# Patient Record
Sex: FEMALE | Race: WHITE | NOT HISPANIC OR LATINO | Employment: OTHER | URBAN - METROPOLITAN AREA
[De-identification: names, ages, dates, MRNs, and addresses within clinical notes are randomized per-mention and may not be internally consistent; named-entity substitution may affect disease eponyms.]

---

## 2021-07-14 ENCOUNTER — HOSPITAL ENCOUNTER (OUTPATIENT)
Dept: MRI IMAGING | Facility: HOSPITAL | Age: 56
Discharge: HOME | End: 2021-07-14
Payer: COMMERCIAL

## 2021-07-14 DIAGNOSIS — M54.12 RADICULOPATHY, CERVICAL REGION: ICD-10-CM

## 2021-07-14 PROCEDURE — 72141 MRI NECK SPINE W/O DYE: CPT

## 2021-07-14 PROCEDURE — G1004 CDSM NDSC: HCPCS

## 2022-05-26 ENCOUNTER — VBI (OUTPATIENT)
Dept: ADMINISTRATIVE | Facility: OTHER | Age: 57
End: 2022-05-26

## 2022-05-26 RX ORDER — CHOLECALCIFEROL (VITAMIN D3) 25 MCG
1 CAPSULE ORAL DAILY
COMMUNITY

## 2022-05-26 RX ORDER — ASCORBIC ACID/MULTIVIT-MIN 1000 MG
1 EFFERVESCENT POWDER IN PACKET ORAL DAILY
COMMUNITY

## 2022-05-26 NOTE — TELEPHONE ENCOUNTER
Upon review of the In Basket request we were able to locate, review, and update the patient chart as requested for Mammogram     Any additional questions or concerns should be emailed to the Practice Liaisons via [email or web address] email, please do not reply via In Basket      Thank you  Glenna Renae

## 2022-05-27 ENCOUNTER — ANNUAL EXAM (OUTPATIENT)
Dept: OBGYN CLINIC | Facility: CLINIC | Age: 57
End: 2022-05-27
Payer: COMMERCIAL

## 2022-05-27 VITALS
DIASTOLIC BLOOD PRESSURE: 98 MMHG | HEIGHT: 64 IN | SYSTOLIC BLOOD PRESSURE: 150 MMHG | BODY MASS INDEX: 30.87 KG/M2 | WEIGHT: 180.8 LBS

## 2022-05-27 DIAGNOSIS — Z01.419 ENCOUNTER FOR ANNUAL ROUTINE GYNECOLOGICAL EXAMINATION: Primary | ICD-10-CM

## 2022-05-27 DIAGNOSIS — Z78.0 MENOPAUSE: ICD-10-CM

## 2022-05-27 DIAGNOSIS — Z12.31 BREAST CANCER SCREENING BY MAMMOGRAM: ICD-10-CM

## 2022-05-27 PROCEDURE — 99396 PREV VISIT EST AGE 40-64: CPT | Performed by: OBSTETRICS & GYNECOLOGY

## 2022-05-27 RX ORDER — LORATADINE 10 MG/1
10 TABLET ORAL AS NEEDED
COMMUNITY

## 2022-05-27 RX ORDER — NORETHINDRONE 0.35 MG/1
1 TABLET ORAL DAILY
COMMUNITY
Start: 2022-02-28

## 2022-05-27 RX ORDER — UBIDECARENONE 50 MG
1 CAPSULE ORAL DAILY
COMMUNITY

## 2022-05-27 RX ORDER — B-COMPLEX WITH VITAMIN C
1 TABLET ORAL DAILY
COMMUNITY

## 2022-05-27 RX ORDER — IBUPROFEN 200 MG
TABLET ORAL DAILY PRN
COMMUNITY

## 2022-05-27 NOTE — LETTER
May 27, 2022     Dee Olguin MD  [address] [facility] [address]    Patient: Mundo Kulkarni   YOB: 1965   Date of Visit: 5/27/2022       Dear Dr Elias Garcia: Thank you for referring Annjonathan Levin to me for evaluation  Below are my notes for this consultation  If you have questions, please do not hesitate to call me  I look forward to following your patient along with you  Sincerely,        Arias Elliott MD        CC: No Recipients  Arias Elliott MD  5/27/2022  3:15 PM  Sign when Signing Visit  Annual Wellness Visit  [address]   [address], [address], [place] [facility]    ASSESSMENT/PLAN: Mundo Kulkarni is a 61 y o  Larkin Nataly who presents for annual gynecologic exam     Encounter for routine gynecologic examination  - Routine well woman exam completed today  - Cervical Cancer Screening: Current ASCCP Guidelines reviewed  Last Pap: 03/01/2021 normal  Next Pap Due: 2 years, routine   - Contraceptive counseling discussed  Current contraception: oral progesterone-only contraceptive,   - Breast Cancer Screening: Last Mammogram 5/2022 per pt normal  - Colorectal cancer screening last 2017, next due 2027  - The following were reviewed in today's visit: mammography screening ordered, family planning choices, menopause, adequate intake of calcium and vitamin D, exercise and healthy diet    Additional problems addressed during this visit:  1  Encounter for annual routine gynecological examination    2  Breast cancer screening by mammogram  -     Mammo screening bilateral w 3d & cad; Future; Expected date: 05/26/2023    3  Menopause  Comments:  Has been on POPs - no bleeding in two years and now 59yo  Feel pt is menopausal and can stop POPs  To call if has menopausal issues  Next visit: 1 year Wellness      CC:  Annual Gynecologic Examination    HPI: Mundo Kulkarni is a 61 y o  Larkin Nataly who presents for annual gynecologic examination    She denies any breast, urinary or pelvic issues at today's visit  Pt on POPs - no period in last two years  Discussed likely menopausal given age and lack of bleeding  Will stop  Gyn History  No LMP recorded  Patient is postmenopausal      Last Pap: 2021 was normal    She  reports being sexually active and has had partner(s) who are male  She reports using the following methods of birth control/protection: OCP and Post-menopausal        OB History      Past Medical History:  No date: GERD (gastroesophageal reflux disease)  No date: Vertigo      Comment:  2018, episode resolved     Past Surgical History:  No date: PALATE SURGERY  No date: WISDOM TOOTH EXTRACTION; Bilateral     No family history on file  Social History     Tobacco Use    Smoking status: Never Smoker    Smokeless tobacco: Never Used   Substance Use Topics    Alcohol use: Yes     Alcohol/week: 5 0 - 8 0 standard drinks     Types: 5 - 8 Standard drinks or equivalent per week    Drug use: Never          Current Outpatient Medications:     Calcium Carbonate-Vitamin D (CALCIUM PLUS VITAMIN D PO), Take 1 tablet by mouth in the morning, Disp: , Rfl:     Cholecalciferol (Vitamin D-3) 25 MCG (1000 UT) CAPS, Take 1 capsule by mouth in the morning, Disp: , Rfl:     ibuprofen (MOTRIN) 200 mg tablet, daily as needed, Disp: , Rfl:     Incassia 0 35 MG tablet, Take 1 tablet by mouth daily, Disp: , Rfl:     loratadine (CLARITIN) 10 mg tablet, Take 10 mg by mouth if needed for allergies, Disp: , Rfl:     Multiple Vitamins-Minerals (Emergen-C Immune Plus) PACK, Take 1 packet by mouth in the morning, Disp: , Rfl:     Red Yeast Rice 600 MG TABS, Take 1 tablet by mouth in the morning, Disp: , Rfl:     Zinc 100 MG TABS, Take 1 tablet by mouth in the morning, Disp: , Rfl:     She is allergic to prochlorperazine       ROS negative except as noted in HPI    Objective:  /98   Ht 5' 3 75" (1 619 m)   Wt 82 kg (180 lb 12 8 oz) Breastfeeding No   BMI 31 28 kg/m²      Physical Exam  Constitutional:       Appearance: Normal appearance  Chest:   Breasts:      Right: Normal  No mass, tenderness or axillary adenopathy  Left: Normal  No mass, tenderness or axillary adenopathy  Abdominal:      Palpations: Abdomen is soft  Tenderness: There is no abdominal tenderness  Genitourinary:     General: Normal vulva  Vagina: No bleeding or lesions  Cervix: Normal       Uterus: Normal  Not tender  Adnexa:         Right: No mass or tenderness  Left: No mass or tenderness  Rectum: No mass or external hemorrhoid  Normal anal tone  Comments: Atrophic changes appropriate to age  Musculoskeletal:         General: Normal range of motion  Lymphadenopathy:      Upper Body:      Right upper body: No axillary adenopathy  Left upper body: No axillary adenopathy  Neurological:      Mental Status: She is alert and oriented to person, place, and time     Psychiatric:         Mood and Affect: Mood normal          Behavior: Behavior normal

## 2022-05-27 NOTE — PROGRESS NOTES
Annual Wellness Visit  [address]   [address], [address], [place] [facility] 1    ASSESSMENT/PLAN: Epifanio Awad is a 61 y o  Yi Homer who presents for annual gynecologic exam     Encounter for routine gynecologic examination  - Routine well woman exam completed today  - Cervical Cancer Screening: Current ASCCP Guidelines reviewed  Last Pap: 2021 normal  Next Pap Due: 2 years, routine   - Contraceptive counseling discussed  Current contraception: oral progesterone-only contraceptive,   - Breast Cancer Screening: Last Mammogram 2022 per pt normal  - Colorectal cancer screening last , next due   - The following were reviewed in today's visit: mammography screening ordered, family planning choices, menopause, adequate intake of calcium and vitamin D, exercise and healthy diet    Additional problems addressed during this visit:  1  Encounter for annual routine gynecological examination    2  Breast cancer screening by mammogram  -     Mammo screening bilateral w 3d & cad; Future; Expected date: 2023    3  Menopause  Comments:  Has been on POPs - no bleeding in two years and now 57yo  Feel pt is menopausal and can stop POPs  To call if has menopausal issues  Next visit: 1 year Wellness      CC:  Annual Gynecologic Examination    HPI: Epifanio Awad is a 61 y o  Yi Homer who presents for annual gynecologic examination  She denies any breast, urinary or pelvic issues at today's visit  Pt on POPs - no period in last two years  Discussed likely menopausal given age and lack of bleeding  Will stop  Gyn History  No LMP recorded  Patient is postmenopausal      Last Pap: 2021 was normal    She  reports being sexually active and has had partner(s) who are male   She reports using the following methods of birth control/protection: OCP and Post-menopausal        OB History      Past Medical History:  No date: GERD (gastroesophageal reflux disease)  No date: Vertigo      Comment:  2018, episode resolved     Past Surgical History:  No date: PALATE SURGERY  No date: WISDOM TOOTH EXTRACTION; Bilateral     No family history on file  Social History     Tobacco Use    Smoking status: Never Smoker    Smokeless tobacco: Never Used   Substance Use Topics    Alcohol use: Yes     Alcohol/week: 5 0 - 8 0 standard drinks     Types: 5 - 8 Standard drinks or equivalent per week    Drug use: Never          Current Outpatient Medications:     Calcium Carbonate-Vitamin D (CALCIUM PLUS VITAMIN D PO), Take 1 tablet by mouth in the morning, Disp: , Rfl:     Cholecalciferol (Vitamin D-3) 25 MCG (1000 UT) CAPS, Take 1 capsule by mouth in the morning, Disp: , Rfl:     ibuprofen (MOTRIN) 200 mg tablet, daily as needed, Disp: , Rfl:     Incassia 0 35 MG tablet, Take 1 tablet by mouth daily, Disp: , Rfl:     loratadine (CLARITIN) 10 mg tablet, Take 10 mg by mouth if needed for allergies, Disp: , Rfl:     Multiple Vitamins-Minerals (Emergen-C Immune Plus) PACK, Take 1 packet by mouth in the morning, Disp: , Rfl:     Red Yeast Rice 600 MG TABS, Take 1 tablet by mouth in the morning, Disp: , Rfl:     Zinc 100 MG TABS, Take 1 tablet by mouth in the morning, Disp: , Rfl:     She is allergic to prochlorperazine       ROS negative except as noted in HPI    Objective:  /98   Ht 5' 3 75" (1 619 m)   Wt 82 kg (180 lb 12 8 oz)   Breastfeeding No   BMI 31 28 kg/m²      Physical Exam  Constitutional:       Appearance: Normal appearance  Chest:   Breasts:      Right: Normal  No mass, tenderness or axillary adenopathy  Left: Normal  No mass, tenderness or axillary adenopathy  Abdominal:      Palpations: Abdomen is soft  Tenderness: There is no abdominal tenderness  Genitourinary:     General: Normal vulva  Vagina: No bleeding or lesions  Cervix: Normal       Uterus: Normal  Not tender  Adnexa:         Right: No mass or tenderness  Left: No mass or tenderness  Rectum: No mass or external hemorrhoid  Normal anal tone  Comments: Atrophic changes appropriate to age  Musculoskeletal:         General: Normal range of motion  Lymphadenopathy:      Upper Body:      Right upper body: No axillary adenopathy  Left upper body: No axillary adenopathy  Neurological:      Mental Status: She is alert and oriented to person, place, and time     Psychiatric:         Mood and Affect: Mood normal          Behavior: Behavior normal

## 2022-12-14 ENCOUNTER — HOSPITAL ENCOUNTER (OUTPATIENT)
Dept: MRI IMAGING | Facility: HOSPITAL | Age: 57
Discharge: HOME | End: 2022-12-14

## 2022-12-14 DIAGNOSIS — M54.12 RADICULOPATHY, CERVICAL REGION: ICD-10-CM

## 2024-02-19 ENCOUNTER — TELEPHONE (OUTPATIENT)
Dept: OBGYN CLINIC | Facility: CLINIC | Age: 59
End: 2024-02-19

## 2024-02-19 DIAGNOSIS — Z12.31 BREAST CANCER SCREENING BY MAMMOGRAM: Primary | ICD-10-CM

## 2024-02-19 NOTE — TELEPHONE ENCOUNTER
Requesting Mammogram order prior to wellness appointment, will be using a [facility] facility, please notify after order has been generated.

## 2024-03-20 ENCOUNTER — HOSPITAL ENCOUNTER (OUTPATIENT)
Dept: MAMMOGRAPHY | Facility: CLINIC | Age: 59
Discharge: HOME | End: 2024-03-20
Payer: COMMERCIAL

## 2024-03-20 VITALS — WEIGHT: 180 LBS | HEIGHT: 64 IN | BODY MASS INDEX: 30.73 KG/M2

## 2024-03-20 DIAGNOSIS — Z12.31 BREAST CANCER SCREENING BY MAMMOGRAM: ICD-10-CM

## 2024-03-20 PROCEDURE — 77063 BREAST TOMOSYNTHESIS BI: CPT

## 2024-03-20 PROCEDURE — 77067 SCR MAMMO BI INCL CAD: CPT

## 2024-03-28 ENCOUNTER — ANNUAL EXAM (OUTPATIENT)
Dept: OBGYN CLINIC | Facility: CLINIC | Age: 59
End: 2024-03-28
Payer: COMMERCIAL

## 2024-03-28 VITALS
HEIGHT: 64 IN | WEIGHT: 189 LBS | DIASTOLIC BLOOD PRESSURE: 84 MMHG | BODY MASS INDEX: 32.27 KG/M2 | SYSTOLIC BLOOD PRESSURE: 128 MMHG

## 2024-03-28 DIAGNOSIS — Z12.4 CERVICAL CANCER SCREENING: ICD-10-CM

## 2024-03-28 DIAGNOSIS — Z01.419 ENCOUNTER FOR ANNUAL ROUTINE GYNECOLOGICAL EXAMINATION: Primary | ICD-10-CM

## 2024-03-28 DIAGNOSIS — N95.1 MENOPAUSAL SYMPTOMS: ICD-10-CM

## 2024-03-28 DIAGNOSIS — Z12.31 BREAST CANCER SCREENING BY MAMMOGRAM: ICD-10-CM

## 2024-03-28 PROCEDURE — 99396 PREV VISIT EST AGE 40-64: CPT | Performed by: OBSTETRICS & GYNECOLOGY

## 2024-03-28 RX ORDER — CETIRIZINE HYDROCHLORIDE 10 MG/1
TABLET ORAL
COMMUNITY
Start: 2023-07-01

## 2024-03-28 RX ORDER — ESTRADIOL/NORETHINDRONE ACETATE TRANSDERMAL SYSTEM .05; .14 MG/D; MG/D
1 PATCH, EXTENDED RELEASE TRANSDERMAL 2 TIMES WEEKLY
Qty: 8 PATCH | Refills: 1 | Status: SHIPPED | OUTPATIENT
Start: 2024-03-28

## 2024-03-28 NOTE — LETTER
2024     Romina Flores MD  [address] [facility] [address]    Patient: Susan Poe   YOB: 1965   Date of Visit: 3/28/2024       Dear Dr. Flores:    Thank you for referring Susan Poe to me for evaluation. Below are my notes for this consultation.    If you have questions, please do not hesitate to call me. I look forward to following your patient along with you.         Sincerely,        Teri Gross MD        CC: No Recipients    Teri Gross MD  3/28/2024  5:15 PM  Sign when Signing Visit  Annual Wellness Visit  [facility] - [address], [address], [place] [address]    ASSESSMENT/PLAN: Susan Poe is a 58 y.o.  who presents for annual gynecologic exam.    Encounter for routine gynecologic examination  - Routine well woman exam completed today.  - Cervical Cancer Screening: Current ASCCP Guidelines reviewed. Last Pap: 2021 normal. Past abnormal pap:  LGSIL.  Next Pap Due: today.  - Contraceptive counseling discussed.  Current contraception: postmenopausal  - Breast Cancer Screening: Last Mammogram 2024, normal  - Colorectal cancer screening last  per pt, next due .  - The following were reviewed in today's visit: mammography screening ordered, use and side effects of HRT, menopause, exercise, and healthy diet    Additional problems addressed during this visit:  1. Encounter for annual routine gynecological examination    2. Breast cancer screening by mammogram  -     Mammo screening bilateral w 3d & cad; Future    3. Cervical cancer screening  -     Thinprep Tis Pap and HPV mRNA E6/E7 Reflex HPV 16,18/45    4. Menopausal symptoms  Assessment & Plan:  Reviewed hot flashes and vasomotor symptoms can be treated with SSRIs, gabapentin, or HRT.  HRT will additionally help vaginal dryness, although vaginal preparations are available as well.  It will likely  not help weight loss or libido.   Discussion of pt  regarding risk and benefits of HRT including review of data from WHI study.  Contraindications to HRT include active or known coronary artery disease, breast cancer, previous VTE or stroke, active liver disease, undiagnosed vaginal bleeding.  Risks are increase risk in VTE, stroke, CAD and breast cancer (risks for estrogen only tx for pt's w/ hysterectomy are only stroke and VTE).  Studies show these risks increase with age at treatment, age at start of treatment and prolonged use.  Data from the HERS study demonstrate short term use of HRT started w/in 10 yrs of menopause have no increase CAD risk.  Risks can be limited by limiting duration of therapy to 5 yrs.  Benefit are reduction hot flashes/night sweats, improve general well being and energy, decrease dysparunia, decrease fracture and colon cancer risk.     After review of pt history and risk and benefits of HRT, pt wishes to start treatment.  Reviewed VTE risk may be lower w/ transdermal estrogen, so recom starting with that at low dose and titrate to symptoms over time.   Should notice response in 2wks time, if not will increase.  Occas vaginal spotting or bleeding okay in first 3-6 months, but should resolve.  Return in 4-6 weeks for f/u.    Orders:  -     estradiol-norethindrone (CombiPatch) 0.05-0.14 MG/DAY; Place 1 patch on the skin 2 (two) times a week        Next visit: 1 year Wellness      CC:  Annual Gynecologic Examination    HPI: Susan Poe is a 58 y.o.  who presents for annual gynecologic examination.      Was on POPs for contraception until age 57yo, when stopped had not had a period in last two years, so likely menopause between 55-57yo.  Having menopausal symptoms, wants to discuss HRT    Having hot flashes day and night.  C/o skin, hair and vaginal dryness.  Decrease libido, brain fog - all more in last year.  Unable to lose weight.  Did try Estroven supplement for about a month with no improvement.      Sexually active, no new  "partners.  Vaginal dryness, using coconut oil.         Gyn History  No LMP recorded. Patient is postmenopausal.     Last Pap: 2021 was normal    She  reports being sexually active and has had partner(s) who are male. She reports using the following methods of birth control/protection: OCP and Post-menopausal.       OB History      Past Medical History:  No date: Allergies  No date: GERD (gastroesophageal reflux disease)  No date: Vertigo      Comment:  2018, episode resolved     Past Surgical History:  No date: PALATE SURGERY  No date: WISDOM TOOTH EXTRACTION; Bilateral     Family History   Problem Relation Age of Onset   • Colon cancer Paternal Aunt    • Breast cancer Neg Hx         Social History     Tobacco Use   • Smoking status: Never   • Smokeless tobacco: Never   Substance Use Topics   • Alcohol use: Yes     Alcohol/week: 5.0 - 8.0 standard drinks of alcohol     Types: 5 - 8 Standard drinks or equivalent per week   • Drug use: Never          Current Outpatient Medications:   •  Calcium Carbonate-Vitamin D (CALCIUM PLUS VITAMIN D PO), Take 1 tablet by mouth in the morning, Disp: , Rfl:   •  cetirizine (ZyrTEC Allergy) 10 mg tablet, , Disp: , Rfl:   •  ibuprofen (MOTRIN) 200 mg tablet, daily as needed, Disp: , Rfl:   •  Multiple Vitamins-Minerals (Emergen-C Immune Plus) PACK, Take 1 packet by mouth in the morning, Disp: , Rfl:   •  Zinc 100 MG TABS, Take 1 tablet by mouth in the morning, Disp: , Rfl:     She is allergic to prochlorperazine..    ROS negative except as noted in HPI    Objective:  /84 (BP Location: Left arm, Patient Position: Sitting, Cuff Size: Standard)   Ht 5' 3.75\" (1.619 m)   Wt 85.7 kg (189 lb)   BMI 32.70 kg/m²      Physical Exam  Constitutional:       Appearance: Normal appearance.   Chest:   Breasts:     Right: Normal. No mass or tenderness.      Left: Normal. No mass or tenderness.   Abdominal:      Palpations: Abdomen is soft.      Tenderness: There is no abdominal " tenderness.   Genitourinary:     General: Normal vulva.      Vagina: No bleeding or lesions.      Cervix: Normal.      Uterus: Normal. Not tender.       Adnexa:         Right: No mass or tenderness.          Left: No mass or tenderness.        Rectum: No mass or external hemorrhoid. Normal anal tone.      Comments: Atrophic changes appropriate to age  Musculoskeletal:         General: Normal range of motion.   Lymphadenopathy:      Upper Body:      Right upper body: No axillary adenopathy.      Left upper body: No axillary adenopathy.   Neurological:      Mental Status: She is alert and oriented to person, place, and time.   Psychiatric:         Mood and Affect: Mood normal.         Behavior: Behavior normal.

## 2024-03-28 NOTE — ASSESSMENT & PLAN NOTE
Reviewed hot flashes and vasomotor symptoms can be treated with SSRIs, gabapentin, or HRT.  HRT will additionally help vaginal dryness, although vaginal preparations are available as well.  It will likely  not help weight loss or libido.   Discussion of pt regarding risk and benefits of HRT including review of data from WHI study.  Contraindications to HRT include active or known coronary artery disease, breast cancer, previous VTE or stroke, active liver disease, undiagnosed vaginal bleeding.  Risks are increase risk in VTE, stroke, CAD and breast cancer (risks for estrogen only tx for pt's w/ hysterectomy are only stroke and VTE).  Studies show these risks increase with age at treatment, age at start of treatment and prolonged use.  Data from the HERS study demonstrate short term use of HRT started w/in 10 yrs of menopause have no increase CAD risk.  Risks can be limited by limiting duration of therapy to 5 yrs.  Benefit are reduction hot flashes/night sweats, improve general well being and energy, decrease dysparunia, decrease fracture and colon cancer risk.     After review of pt history and risk and benefits of HRT, pt wishes to start treatment.  Reviewed VTE risk may be lower w/ transdermal estrogen, so recom starting with that at low dose and titrate to symptoms over time.   Should notice response in 2wks time, if not will increase.  Occas vaginal spotting or bleeding okay in first 3-6 months, but should resolve.  Return in 4-6 weeks for f/u.

## 2024-03-28 NOTE — PROGRESS NOTES
Annual Wellness Visit  [facility] - [address], [address], [place] [address]    ASSESSMENT/PLAN: Susan Poe is a 58 y.o.  who presents for annual gynecologic exam.    Encounter for routine gynecologic examination  - Routine well woman exam completed today.  - Cervical Cancer Screening: Current ASCCP Guidelines reviewed. Last Pap: 2021 normal. Past abnormal pap:  LGSIL.  Next Pap Due: today.  - Contraceptive counseling discussed.  Current contraception: postmenopausal  - Breast Cancer Screening: Last Mammogram 2024, normal  - Colorectal cancer screening last  per pt, next due .  - The following were reviewed in today's visit: mammography screening ordered, use and side effects of HRT, menopause, exercise, and healthy diet    Additional problems addressed during this visit:  1. Encounter for annual routine gynecological examination    2. Breast cancer screening by mammogram  -     Mammo screening bilateral w 3d & cad; Future    3. Cervical cancer screening  -     Thinprep Tis Pap and HPV mRNA E6/E7 Reflex HPV 16,18/45    4. Menopausal symptoms  Assessment & Plan:  Reviewed hot flashes and vasomotor symptoms can be treated with SSRIs, gabapentin, or HRT.  HRT will additionally help vaginal dryness, although vaginal preparations are available as well.  It will likely  not help weight loss or libido.   Discussion of pt regarding risk and benefits of HRT including review of data from WHI study.  Contraindications to HRT include active or known coronary artery disease, breast cancer, previous VTE or stroke, active liver disease, undiagnosed vaginal bleeding.  Risks are increase risk in VTE, stroke, CAD and breast cancer (risks for estrogen only tx for pt's w/ hysterectomy are only stroke and VTE).  Studies show these risks increase with age at treatment, age at start of treatment and prolonged use.  Data from the HERS study demonstrate short term use of  HRT started w/in 10 yrs of menopause have no increase CAD risk.  Risks can be limited by limiting duration of therapy to 5 yrs.  Benefit are reduction hot flashes/night sweats, improve general well being and energy, decrease dysparunia, decrease fracture and colon cancer risk.     After review of pt history and risk and benefits of HRT, pt wishes to start treatment.  Reviewed VTE risk may be lower w/ transdermal estrogen, so recom starting with that at low dose and titrate to symptoms over time.   Should notice response in 2wks time, if not will increase.  Occas vaginal spotting or bleeding okay in first 3-6 months, but should resolve.  Return in 4-6 weeks for f/u.    Orders:  -     estradiol-norethindrone (CombiPatch) 0.05-0.14 MG/DAY; Place 1 patch on the skin 2 (two) times a week        Next visit: 1 year Wellness      CC:  Annual Gynecologic Examination    HPI: Susan Poe is a 58 y.o.  who presents for annual gynecologic examination.      Was on POPs for contraception until age 55yo, when stopped had not had a period in last two years, so likely menopause between 55-56yo.  Having menopausal symptoms, wants to discuss HRT    Having hot flashes day and night.  C/o skin, hair and vaginal dryness.  Decrease libido, brain fog - all more in last year.  Unable to lose weight.  Did try Estroven supplement for about a month with no improvement.      Sexually active, no new partners.  Vaginal dryness, using coconut oil.         Gyn History  No LMP recorded. Patient is postmenopausal.     Last Pap: 2021 was normal    She  reports being sexually active and has had partner(s) who are male. She reports using the following methods of birth control/protection: OCP and Post-menopausal.       OB History      Past Medical History:  No date: Allergies  No date: GERD (gastroesophageal reflux disease)  No date: Vertigo      Comment:  2018, episode resolved     Past Surgical History:  No date: PALATE  "SURGERY  No date: WISDOM TOOTH EXTRACTION; Bilateral     Family History   Problem Relation Age of Onset    Colon cancer Paternal Aunt     Breast cancer Neg Hx         Social History     Tobacco Use    Smoking status: Never    Smokeless tobacco: Never   Substance Use Topics    Alcohol use: Yes     Alcohol/week: 5.0 - 8.0 standard drinks of alcohol     Types: 5 - 8 Standard drinks or equivalent per week    Drug use: Never          Current Outpatient Medications:     Calcium Carbonate-Vitamin D (CALCIUM PLUS VITAMIN D PO), Take 1 tablet by mouth in the morning, Disp: , Rfl:     cetirizine (ZyrTEC Allergy) 10 mg tablet, , Disp: , Rfl:     ibuprofen (MOTRIN) 200 mg tablet, daily as needed, Disp: , Rfl:     Multiple Vitamins-Minerals (Emergen-C Immune Plus) PACK, Take 1 packet by mouth in the morning, Disp: , Rfl:     Zinc 100 MG TABS, Take 1 tablet by mouth in the morning, Disp: , Rfl:     She is allergic to prochlorperazine..    ROS negative except as noted in HPI    Objective:  /84 (BP Location: Left arm, Patient Position: Sitting, Cuff Size: Standard)   Ht 5' 3.75\" (1.619 m)   Wt 85.7 kg (189 lb)   BMI 32.70 kg/m²      Physical Exam  Constitutional:       Appearance: Normal appearance.   Chest:   Breasts:     Right: Normal. No mass or tenderness.      Left: Normal. No mass or tenderness.   Abdominal:      Palpations: Abdomen is soft.      Tenderness: There is no abdominal tenderness.   Genitourinary:     General: Normal vulva.      Vagina: No bleeding or lesions.      Cervix: Normal.      Uterus: Normal. Not tender.       Adnexa:         Right: No mass or tenderness.          Left: No mass or tenderness.        Rectum: No mass or external hemorrhoid. Normal anal tone.      Comments: Atrophic changes appropriate to age  Musculoskeletal:         General: Normal range of motion.   Lymphadenopathy:      Upper Body:      Right upper body: No axillary adenopathy.      Left upper body: No axillary adenopathy. "   Neurological:      Mental Status: She is alert and oriented to person, place, and time.   Psychiatric:         Mood and Affect: Mood normal.         Behavior: Behavior normal.

## 2024-04-01 LAB
CLINICAL INFO: NORMAL
CYTO CVX: NORMAL
CYTOLOGY CMNT CVX/VAG CYTO-IMP: NORMAL
DATE PREVIOUS BX: NORMAL
HPV E6+E7 MRNA CVX QL NAA+PROBE: NOT DETECTED
LMP START DATE: NORMAL
SL AMB PREV. PAP:: NORMAL
SPECIMEN SOURCE CVX/VAG CYTO: NORMAL

## 2024-04-30 NOTE — PROGRESS NOTES
[facility] - [address], [address], [place] [address]    Assessment/Plan:  Susan is a 58 y.o. year old  who presents for follow up medication.  1. Menopausal symptoms  -     estradiol-norethindrone (CombiPatch) 0.05-0.14 MG/DAY; Place 1 patch on the skin 2 (two) times a week    2. Other screening mammogram  Comments:  pt asked for order  Orders:  -     Mammo screening bilateral w 3d & cad; Future        Next Exam: 2024 Wellness due    Subjective:     HPI: Susan Poe is a 58 y.o. who presents for medication follow up.   At 3/2024 WA - c/o hot flashes, brain fog, skin and hair changes.  OTC Estroven not helpful.  Discussed options and wishes to try HRT.    Started Combi patch.  Feels hot flashes less severe.  Does note sleep has been much better since starting, although she hadn't thought to mention that at her appointment before.  No bleeding or other side effects.        The following portions of the patient's history were reviewed and updated as appropriate: allergies, current medications, past family history, past medical history, obstetric history, gynecologic history, past social history, past surgical history and problem list.    ROS: Review of Systems   Constitutional: Negative.    Gastrointestinal: Negative.    Genitourinary: Negative.    Psychiatric/Behavioral: Negative.           Current Outpatient Medications:     Calcium Carbonate-Vitamin D (CALCIUM PLUS VITAMIN D PO), Take 1 tablet by mouth in the morning, Disp: , Rfl:     cetirizine (ZyrTEC Allergy) 10 mg tablet, , Disp: , Rfl:     estradiol-norethindrone (CombiPatch) 0.05-0.14 MG/DAY, Place 1 patch on the skin 2 (two) times a week, Disp: 24 patch, Rfl: 3    ibuprofen (MOTRIN) 200 mg tablet, daily as needed, Disp: , Rfl:     Multiple Vitamins-Minerals (Emergen-C Immune Plus) PACK, Take 1 packet by mouth in the morning, Disp: , Rfl:     Objective:  /84 (BP Location: Left arm, Patient Position: Sitting,  "Cuff Size: Standard)   Ht 5' 3.75\" (1.619 m)   Wt 88 kg (194 lb)   BMI 33.56 kg/m²      Physical Exam  Constitutional:       Appearance: Normal appearance.   Neurological:      Mental Status: She is alert and oriented to person, place, and time.   Psychiatric:         Behavior: Behavior normal.         "

## 2024-05-02 ENCOUNTER — OFFICE VISIT (OUTPATIENT)
Dept: OBGYN CLINIC | Facility: CLINIC | Age: 59
End: 2024-05-02
Payer: COMMERCIAL

## 2024-05-02 VITALS
SYSTOLIC BLOOD PRESSURE: 130 MMHG | HEIGHT: 64 IN | WEIGHT: 194 LBS | BODY MASS INDEX: 33.12 KG/M2 | DIASTOLIC BLOOD PRESSURE: 84 MMHG

## 2024-05-02 DIAGNOSIS — Z12.31 OTHER SCREENING MAMMOGRAM: ICD-10-CM

## 2024-05-02 DIAGNOSIS — N95.1 MENOPAUSAL SYMPTOMS: Primary | ICD-10-CM

## 2024-05-02 PROCEDURE — 99213 OFFICE O/P EST LOW 20 MIN: CPT | Performed by: OBSTETRICS & GYNECOLOGY

## 2024-05-02 RX ORDER — ESTRADIOL/NORETHINDRONE ACETATE TRANSDERMAL SYSTEM .05; .14 MG/D; MG/D
1 PATCH, EXTENDED RELEASE TRANSDERMAL 2 TIMES WEEKLY
Qty: 24 PATCH | Refills: 3 | Status: SHIPPED | OUTPATIENT
Start: 2024-05-02

## 2024-05-02 NOTE — ASSESSMENT & PLAN NOTE
Started Combipatch in march for menopausal symptoms.  States some improvement in hot flashes since started.  Excellent improvement in sleep.  She wishes to continue.  No problems.  Refilled for 1 year.

## 2025-03-25 ENCOUNTER — HOSPITAL ENCOUNTER (OUTPATIENT)
Dept: MAMMOGRAPHY | Facility: CLINIC | Age: 60
Discharge: HOME | End: 2025-03-25
Payer: COMMERCIAL

## 2025-03-25 VITALS — HEIGHT: 64 IN | WEIGHT: 194 LBS | BODY MASS INDEX: 33.12 KG/M2

## 2025-03-25 DIAGNOSIS — Z12.31 OTHER SCREENING MAMMOGRAM: ICD-10-CM

## 2025-03-25 PROCEDURE — 77067 SCR MAMMO BI INCL CAD: CPT

## 2025-03-25 PROCEDURE — 77063 BREAST TOMOSYNTHESIS BI: CPT

## 2025-03-31 ENCOUNTER — RESULTS FOLLOW-UP (OUTPATIENT)
Dept: OBGYN CLINIC | Facility: CLINIC | Age: 60
End: 2025-03-31

## 2025-04-10 DIAGNOSIS — N95.1 MENOPAUSAL SYMPTOMS: ICD-10-CM

## 2025-04-10 RX ORDER — ESTRADIOL/NORETHINDRONE ACETATE TRANSDERMAL SYSTEM .05; .14 MG/D; MG/D
PATCH, EXTENDED RELEASE TRANSDERMAL
Qty: 24 PATCH | Refills: 0 | Status: SHIPPED | OUTPATIENT
Start: 2025-04-10

## 2025-05-09 ENCOUNTER — ANNUAL EXAM (OUTPATIENT)
Dept: OBGYN CLINIC | Facility: CLINIC | Age: 60
End: 2025-05-09
Payer: COMMERCIAL

## 2025-05-09 VITALS
BODY MASS INDEX: 30.7 KG/M2 | HEIGHT: 64 IN | DIASTOLIC BLOOD PRESSURE: 88 MMHG | WEIGHT: 179.8 LBS | SYSTOLIC BLOOD PRESSURE: 142 MMHG

## 2025-05-09 DIAGNOSIS — Z01.419 ENCOUNTER FOR ANNUAL ROUTINE GYNECOLOGICAL EXAMINATION: Primary | ICD-10-CM

## 2025-05-09 DIAGNOSIS — Z12.31 BREAST CANCER SCREENING BY MAMMOGRAM: ICD-10-CM

## 2025-05-09 DIAGNOSIS — N95.1 MENOPAUSAL SYMPTOMS: ICD-10-CM

## 2025-05-09 PROCEDURE — S0612 ANNUAL GYNECOLOGICAL EXAMINA: HCPCS | Performed by: OBSTETRICS & GYNECOLOGY

## 2025-05-09 RX ORDER — ESTRADIOL/NORETHINDRONE ACETATE TRANSDERMAL SYSTEM .05; .14 MG/D; MG/D
1 PATCH, EXTENDED RELEASE TRANSDERMAL 2 TIMES WEEKLY
Qty: 24 PATCH | Refills: 4 | Status: SHIPPED | OUTPATIENT
Start: 2025-05-09

## 2025-05-09 RX ORDER — BENZONATATE 100 MG/1
100 CAPSULE ORAL 3 TIMES DAILY PRN
COMMUNITY

## 2025-05-09 RX ORDER — PSEUDOEPHEDRINE HCL 30 MG/1
60 TABLET, FILM COATED ORAL EVERY 4 HOURS PRN
COMMUNITY

## 2025-05-09 NOTE — PROGRESS NOTES
Annual Wellness Visit  [facility] - [address], [address], [place] [address]    ASSESSMENT/PLAN: Susan Poe is a 59 y.o.  who presents for annual gynecologic exam.    Assessment & Plan  Encounter for annual routine gynecological examination  - Routine well woman exam completed today.  - Cervical Cancer Screening: Current ASCCP Guidelines reviewed. Last Pap: 2024 normal. Past abnormal pap: h/o LGSIL .  Next Pap Due: 2 years.  - Contraceptive counseling discussed.  Current contraception: postmenopsausal  - Breast Cancer Screening: Last Mammogram 2025, normal  - Colorectal cancer screening last , next due .  - The following were reviewed in today's visit: mammography screening ordered, use and side effects of HRT, menopause, adequate intake of calcium and vitamin D, exercise, and healthy diet       Breast cancer screening by mammogram    Orders:    Mammo screening bilateral w 3d and cad; Future    Menopausal symptoms  Started Combipatch last year due to hot flashes day and night which made sleep poor, as well as other menopausal symptoms.  Significant improvement in hot flashes and sleep.     Reviewed w/ pt risks are increase risk in VTE, stroke, CAD and breast cancer (risks for estrogen only, pt after hysterectomy, are only stroke and VTE).  Studies show these risks increase with age at treatment, age at start of treatment and prolonged use.  Risks can be limited by limiting duration of therapy to 5 yrs.  Benefit are reduction hot flashes/night sweats, improve general well being and energy, decrease dysparunia, decrease fracture and colon cancer risk.   Pt wishes to continue on HRT.  Expressed understanding of risk and benefits of continuing medication.  Refilled 1 yr.    Orders:    estradiol-norethindrone (CombiPatch) 0.05-0.14 MG/DAY; Place 1 patch on the skin 2 (two) times a week      Next visit: 1 year Wellness      CC:  Annual Gynecologic  Examination    HPI: Susan Poe is a 59 y.o.  who presents for annual gynecologic examination.  She denies any breast, urinary or pelvic issues at today's visit.    Started Combipatch 3/2025 for hot flashes and sleep issues.  Doing much better.  Sexually active, no new partners.  Using lubrication.      Lost about 15lbs since last year with keto diet and had improvement in other inflammation as well.          Gyn History  No LMP recorded. Patient is postmenopausal.     Last Pap: 2024 was normal    She  reports being sexually active and has had partner(s) who are male. She reports using the following methods of birth control/protection: OCP and Post-menopausal.       OB History      Past Medical History:  No date: Allergies  No date: GERD (gastroesophageal reflux disease)  No date: Vertigo      Comment:  , episode resolved     Past Surgical History:  No date: PALATE SURGERY  No date: WISDOM TOOTH EXTRACTION; Bilateral     Family History   Problem Relation Age of Onset    No Known Problems Mother     No Known Problems Father     No Known Problems Sister     No Known Problems Brother     No Known Problems Brother     No Known Problems Maternal Grandmother     No Known Problems Maternal Grandfather     No Known Problems Paternal Grandmother     No Known Problems Paternal Grandfather     Cancer Maternal Aunt         unknown primary    Colon cancer Paternal Aunt     Cancer Paternal Aunt     Cancer Paternal Aunt     Breast cancer Neg Hx         Social History     Tobacco Use    Smoking status: Never    Smokeless tobacco: Never   Substance Use Topics    Alcohol use: Yes     Alcohol/week: 5.0 - 8.0 standard drinks of alcohol     Types: 5 - 8 Standard drinks or equivalent per week    Drug use: Never          Current Outpatient Medications:     benzonatate (TESSALON PERLES) 100 mg capsule, Take 100 mg by mouth 3 (three) times a day as needed for cough, Disp: , Rfl:     Calcium Carbonate-Vitamin D  "(CALCIUM PLUS VITAMIN D PO), Take 1 tablet by mouth in the morning, Disp: , Rfl:     cetirizine (ZyrTEC Allergy) 10 mg tablet, , Disp: , Rfl:     estradiol-norethindrone (CombiPatch) 0.05-0.14 MG/DAY, Place 1 patch on the skin 2 (two) times a week, Disp: 24 patch, Rfl: 4    ibuprofen (MOTRIN) 200 mg tablet, daily as needed, Disp: , Rfl:     Multiple Vitamins-Minerals (Emergen-C Immune Plus) PACK, Take 1 packet by mouth in the morning, Disp: , Rfl:     pseudoephedrine (SUDAFED) 30 mg tablet, Take 60 mg by mouth every 4 (four) hours as needed for congestion, Disp: , Rfl:     She is allergic to prochlorperazine..    ROS negative except as noted in HPI    Objective:  /88 (BP Location: Left arm, Patient Position: Sitting, Cuff Size: Large)   Ht 5' 3.5\" (1.613 m)   Wt 81.6 kg (179 lb 12.8 oz)   BMI 31.35 kg/m²      Physical Exam  Constitutional:       Appearance: Normal appearance.   Chest:   Breasts:     Right: Normal. No mass or tenderness.      Left: Normal. No mass or tenderness.   Abdominal:      Palpations: Abdomen is soft.      Tenderness: There is no abdominal tenderness.   Genitourinary:     General: Normal vulva.      Vagina: No bleeding or lesions.      Cervix: Normal.      Uterus: Normal. Not tender.       Adnexa:         Right: No mass or tenderness.          Left: No mass or tenderness.        Rectum: No mass or external hemorrhoid. Normal anal tone.      Comments: Atrophic changes appropriate to age  Musculoskeletal:         General: Normal range of motion.   Lymphadenopathy:      Upper Body:      Right upper body: No axillary adenopathy.      Left upper body: No axillary adenopathy.   Neurological:      Mental Status: She is alert and oriented to person, place, and time.   Psychiatric:         Mood and Affect: Mood normal.         Behavior: Behavior normal.         "

## 2025-05-09 NOTE — ASSESSMENT & PLAN NOTE
Started Combipatch last year due to hot flashes day and night which made sleep poor, as well as other menopausal symptoms.  Significant improvement in hot flashes and sleep.     Reviewed w/ pt risks are increase risk in VTE, stroke, CAD and breast cancer (risks for estrogen only, pt after hysterectomy, are only stroke and VTE).  Studies show these risks increase with age at treatment, age at start of treatment and prolonged use.  Risks can be limited by limiting duration of therapy to 5 yrs.  Benefit are reduction hot flashes/night sweats, improve general well being and energy, decrease dysparunia, decrease fracture and colon cancer risk.   Pt wishes to continue on HRT.  Expressed understanding of risk and benefits of continuing medication.  Refilled 1 yr.    Orders:    estradiol-norethindrone (CombiPatch) 0.05-0.14 MG/DAY; Place 1 patch on the skin 2 (two) times a week

## 2025-05-09 NOTE — LETTER
May 9, 2025     Romina Flores MD  [address] [facility] [address]    Patient: Susan Poe   YOB: 1965   Date of Visit: 2025       Dear Dr. Romina Flores MD:    Thank you for referring Susan Poe to me for evaluation. Below are my notes for this consultation.    If you have questions, please do not hesitate to call me. I look forward to following your patient along with you.         Sincerely,        Teri Gross MD        CC: No Recipients    Teri Gross MD  2025  2:56 PM  Sign when Signing Visit  Annual Wellness Visit  [facility] - [address], [address], [place] [address]    ASSESSMENT/PLAN: Susan Poe is a 59 y.o.  who presents for annual gynecologic exam.    Assessment & Plan  Encounter for annual routine gynecological examination  - Routine well woman exam completed today.  - Cervical Cancer Screening: Current ASCCP Guidelines reviewed. Last Pap: 2024 normal. Past abnormal pap: h/o LGSIL .  Next Pap Due: 2 years.  - Contraceptive counseling discussed.  Current contraception: postmenopsausal  - Breast Cancer Screening: Last Mammogram 2025, normal  - Colorectal cancer screening last , next due .  - The following were reviewed in today's visit: mammography screening ordered, use and side effects of HRT, menopause, adequate intake of calcium and vitamin D, exercise, and healthy diet       Breast cancer screening by mammogram    Orders:  •  Mammo screening bilateral w 3d and cad; Future    Menopausal symptoms  Started Combipatch last year due to hot flashes day and night which made sleep poor, as well as other menopausal symptoms.  Significant improvement in hot flashes and sleep.     Reviewed w/ pt risks are increase risk in VTE, stroke, CAD and breast cancer (risks for estrogen only, pt after hysterectomy, are only stroke and VTE).  Studies show these risks increase with age at treatment, age at start of  treatment and prolonged use.  Risks can be limited by limiting duration of therapy to 5 yrs.  Benefit are reduction hot flashes/night sweats, improve general well being and energy, decrease dysparunia, decrease fracture and colon cancer risk.   Pt wishes to continue on HRT.  Expressed understanding of risk and benefits of continuing medication.  Refilled 1 yr.    Orders:  •  estradiol-norethindrone (CombiPatch) 0.05-0.14 MG/DAY; Place 1 patch on the skin 2 (two) times a week      Next visit: 1 year Wellness      CC:  Annual Gynecologic Examination    HPI: Susan Poe is a 59 y.o.  who presents for annual gynecologic examination.  She denies any breast, urinary or pelvic issues at today's visit.    Started Combipatch 3/2025 for hot flashes and sleep issues.  Doing much better.  Sexually active, no new partners.  Using lubrication.      Lost about 15lbs since last year with keto diet and had improvement in other inflammation as well.          Gyn History  No LMP recorded. Patient is postmenopausal.     Last Pap: 2024 was normal    She  reports being sexually active and has had partner(s) who are male. She reports using the following methods of birth control/protection: OCP and Post-menopausal.       OB History      Past Medical History:  No date: Allergies  No date: GERD (gastroesophageal reflux disease)  No date: Vertigo      Comment:  2018, episode resolved     Past Surgical History:  No date: PALATE SURGERY  No date: WISDOM TOOTH EXTRACTION; Bilateral     Family History   Problem Relation Age of Onset   • No Known Problems Mother    • No Known Problems Father    • No Known Problems Sister    • No Known Problems Brother    • No Known Problems Brother    • No Known Problems Maternal Grandmother    • No Known Problems Maternal Grandfather    • No Known Problems Paternal Grandmother    • No Known Problems Paternal Grandfather    • Cancer Maternal Aunt         unknown primary   • Colon cancer  "Paternal Aunt    • Cancer Paternal Aunt    • Cancer Paternal Aunt    • Breast cancer Neg Hx         Social History     Tobacco Use   • Smoking status: Never   • Smokeless tobacco: Never   Substance Use Topics   • Alcohol use: Yes     Alcohol/week: 5.0 - 8.0 standard drinks of alcohol     Types: 5 - 8 Standard drinks or equivalent per week   • Drug use: Never          Current Outpatient Medications:   •  benzonatate (TESSALON PERLES) 100 mg capsule, Take 100 mg by mouth 3 (three) times a day as needed for cough, Disp: , Rfl:   •  Calcium Carbonate-Vitamin D (CALCIUM PLUS VITAMIN D PO), Take 1 tablet by mouth in the morning, Disp: , Rfl:   •  cetirizine (ZyrTEC Allergy) 10 mg tablet, , Disp: , Rfl:   •  estradiol-norethindrone (CombiPatch) 0.05-0.14 MG/DAY, Place 1 patch on the skin 2 (two) times a week, Disp: 24 patch, Rfl: 4  •  ibuprofen (MOTRIN) 200 mg tablet, daily as needed, Disp: , Rfl:   •  Multiple Vitamins-Minerals (Emergen-C Immune Plus) PACK, Take 1 packet by mouth in the morning, Disp: , Rfl:   •  pseudoephedrine (SUDAFED) 30 mg tablet, Take 60 mg by mouth every 4 (four) hours as needed for congestion, Disp: , Rfl:     She is allergic to prochlorperazine..    ROS negative except as noted in HPI    Objective:  /88 (BP Location: Left arm, Patient Position: Sitting, Cuff Size: Large)   Ht 5' 3.5\" (1.613 m)   Wt 81.6 kg (179 lb 12.8 oz)   BMI 31.35 kg/m²      Physical Exam  Constitutional:       Appearance: Normal appearance.   Chest:   Breasts:     Right: Normal. No mass or tenderness.      Left: Normal. No mass or tenderness.   Abdominal:      Palpations: Abdomen is soft.      Tenderness: There is no abdominal tenderness.   Genitourinary:     General: Normal vulva.      Vagina: No bleeding or lesions.      Cervix: Normal.      Uterus: Normal. Not tender.       Adnexa:         Right: No mass or tenderness.          Left: No mass or tenderness.        Rectum: No mass or external hemorrhoid. Normal " anal tone.      Comments: Atrophic changes appropriate to age  Musculoskeletal:         General: Normal range of motion.   Lymphadenopathy:      Upper Body:      Right upper body: No axillary adenopathy.      Left upper body: No axillary adenopathy.   Neurological:      Mental Status: She is alert and oriented to person, place, and time.   Psychiatric:         Mood and Affect: Mood normal.         Behavior: Behavior normal.